# Patient Record
Sex: FEMALE | Race: WHITE | HISPANIC OR LATINO | URBAN - METROPOLITAN AREA
[De-identification: names, ages, dates, MRNs, and addresses within clinical notes are randomized per-mention and may not be internally consistent; named-entity substitution may affect disease eponyms.]

---

## 2020-05-21 ENCOUNTER — APPOINTMENT (RX ONLY)
Dept: URBAN - METROPOLITAN AREA CLINIC 15 | Facility: CLINIC | Age: 40
Setting detail: DERMATOLOGY
End: 2020-05-21

## 2020-05-21 DIAGNOSIS — L81.1 CHLOASMA: ICD-10-CM

## 2020-05-21 PROCEDURE — ? COUNSELING

## 2020-05-21 PROCEDURE — 99202 OFFICE O/P NEW SF 15 MIN: CPT

## 2020-05-21 PROCEDURE — ? ADDITIONAL NOTES

## 2020-05-21 ASSESSMENT — LOCATION ZONE DERM: LOCATION ZONE: FACE

## 2020-05-21 ASSESSMENT — LOCATION DETAILED DESCRIPTION DERM
LOCATION DETAILED: LEFT INFERIOR CENTRAL MALAR CHEEK
LOCATION DETAILED: LEFT INFERIOR MEDIAL MALAR CHEEK
LOCATION DETAILED: RIGHT INFERIOR CENTRAL MALAR CHEEK

## 2020-05-21 ASSESSMENT — LOCATION SIMPLE DESCRIPTION DERM
LOCATION SIMPLE: RIGHT CHEEK
LOCATION SIMPLE: LEFT CHEEK

## 2020-05-21 NOTE — PROCEDURE: ADDITIONAL NOTES
Additional Notes: Zo System \\n\\nAM\\n\\n- Cleanse \\n- Daily power defense \\n- Pigment control \\n- Sunblock \\n\\nPM\\n\\n- Cleanse \\n- ZO pads \\n- Pigment control creme\\n- Skinbetter Alpha ret \\n- ZO Pigment blending creme\\n\\n* Zo exfoliating polish 3x a week* \\n\\n- Advised patient skin care regimen will be about 6-8 weeks and after laser can be done \\n\\n- Discussed ZO chemical peel (recommend 3 sessions)\\n\\n- Cerave PM moisturizer ( otc )
Detail Level: Zone

## 2020-05-21 NOTE — PROCEDURE: MIPS QUALITY
Additional Notes: Not currently on medication
Quality 130: Documentation Of Current Medications In The Medical Record: Eligible clinician attests to documenting in the medical record the patient is not eligible for a current list of medications being obtained, updated, or reviewed by the eligible clinician
Detail Level: Detailed